# Patient Record
Sex: MALE | Race: BLACK OR AFRICAN AMERICAN | NOT HISPANIC OR LATINO | Employment: UNEMPLOYED | URBAN - METROPOLITAN AREA
[De-identification: names, ages, dates, MRNs, and addresses within clinical notes are randomized per-mention and may not be internally consistent; named-entity substitution may affect disease eponyms.]

---

## 2020-04-26 PROBLEM — S72.111A: Status: ACTIVE | Noted: 2020-04-26

## 2020-04-26 PROBLEM — S72.121A: Status: ACTIVE | Noted: 2020-04-26

## 2020-04-26 PROBLEM — S32.409A ACETABULAR FRACTURE: Status: ACTIVE | Noted: 2020-04-26

## 2020-04-26 PROBLEM — G92.9 TOXIC ENCEPHALOPATHY: Status: ACTIVE | Noted: 2020-04-26

## 2020-04-26 PROBLEM — S73.014A: Status: ACTIVE | Noted: 2020-04-26

## 2020-05-12 ENCOUNTER — NURSE TRIAGE (OUTPATIENT)
Dept: ADMINISTRATIVE | Facility: CLINIC | Age: 30
End: 2020-05-12

## 2020-05-12 NOTE — TELEPHONE ENCOUNTER
Spoke with patient. Called on behalf of [facility]. Patient denies experiencing cough, fever or difficulty breathing since procedure. States he is doing well.     Reason for Disposition   Health Information question, no triage required and triager able to answer question    Additional Information   Negative: [1] Caller is not with the adult (patient) AND [2] reporting urgent symptoms   Negative: Lab result questions   Negative: Medication questions   Negative: Caller can't be reached by phone   Negative: Caller has already spoken to PCP or another triager   Negative: RN needs further essential information from caller in order to complete triage   Negative: Requesting regular office appointment   Negative: [1] Caller requesting NON-URGENT health information AND [2] PCP's office is the best resource    Protocols used: INFORMATION ONLY CALL-A-

## 2020-10-15 PROBLEM — S72.121A: Status: RESOLVED | Noted: 2020-04-26 | Resolved: 2020-10-15

## 2020-10-15 PROBLEM — S72.111A: Status: RESOLVED | Noted: 2020-04-26 | Resolved: 2020-10-15
